# Patient Record
Sex: MALE
[De-identification: names, ages, dates, MRNs, and addresses within clinical notes are randomized per-mention and may not be internally consistent; named-entity substitution may affect disease eponyms.]

---

## 2023-02-09 ENCOUNTER — NURSE TRIAGE (OUTPATIENT)
Dept: OTHER | Facility: CLINIC | Age: 18
End: 2023-02-09

## 2023-02-09 NOTE — TELEPHONE ENCOUNTER
Location of patient: [place]     Subjective: Caller states he is having chest tightness in the center of chest. Pt states he recently got over a cold. Pt states it tightness comes and goes and at times is in his lower left back. Former smoker. Denies lung hx. Pt denies cough. Current Symptoms: Chest tightness    Onset: 3 days ago; gradual    Associated Symptoms:     Pain Severity: 6/10; pressure; intermittent/ tightness    Temperature: Denies     What has been tried: Dayquil    LMP: NA Pregnant: NA    Recommended disposition: See PCP within 24 Hours    Care advice provided, patient verbalizes understanding; denies any other questions or concerns; instructed to call back for any new or worsening symptoms. Patient/caller agrees to follow-up with PCP     This triage is a result of a call to [address] [place] [address]. Please do not respond to the triage nurse through this encounter. Any subsequent communication should be directly with the patient.           Reason for Disposition   [1] Chest pain lasts < 5 minutes AND [2] NO chest pain or cardiac symptoms (e.g., breathing difficulty, sweating) now (Exception: chest pains that last only a few seconds)    Protocols used: Chest Pain-ADULT-

## 2024-01-12 ENCOUNTER — HOSPITAL ENCOUNTER (EMERGENCY)
Facility: HOSPITAL | Age: 19
Discharge: HOME | End: 2024-01-13
Payer: COMMERCIAL

## 2024-01-12 DIAGNOSIS — J02.9 SORE THROAT: Primary | ICD-10-CM

## 2024-01-12 PROBLEM — H53.149 EYES SENSITIVE TO LIGHT: Status: ACTIVE | Noted: 2024-01-12

## 2024-01-12 PROBLEM — R51.9 CHRONIC HEADACHES: Status: ACTIVE | Noted: 2024-01-12

## 2024-01-12 PROBLEM — F43.9 STRESS: Status: ACTIVE | Noted: 2024-01-12

## 2024-01-12 PROBLEM — E66.9 CHILDHOOD OBESITY: Status: ACTIVE | Noted: 2024-01-12

## 2024-01-12 PROBLEM — G89.29 CHRONIC HEADACHES: Status: ACTIVE | Noted: 2024-01-12

## 2024-01-12 PROBLEM — J30.9 ALLERGIC RHINITIS: Status: ACTIVE | Noted: 2024-01-12

## 2024-01-12 PROBLEM — F48.9 MOOD PROBLEM: Status: ACTIVE | Noted: 2024-01-12

## 2024-01-12 PROBLEM — G43.109 COMPLICATED MIGRAINE: Status: ACTIVE | Noted: 2024-01-12

## 2024-01-12 PROBLEM — R10.9 LEFT FLANK PAIN: Status: ACTIVE | Noted: 2024-01-12

## 2024-01-12 PROBLEM — F43.20 ADJUSTMENT REACTION: Status: ACTIVE | Noted: 2024-01-12

## 2024-01-12 PROCEDURE — 87651 STREP A DNA AMP PROBE: CPT | Performed by: PHYSICIAN ASSISTANT

## 2024-01-12 PROCEDURE — 87636 SARSCOV2 & INF A&B AMP PRB: CPT | Performed by: PHYSICIAN ASSISTANT

## 2024-01-12 PROCEDURE — 99283 EMERGENCY DEPT VISIT LOW MDM: CPT

## 2024-01-12 ASSESSMENT — PAIN SCALES - GENERAL: PAINLEVEL_OUTOF10: 7

## 2024-01-12 ASSESSMENT — PAIN - FUNCTIONAL ASSESSMENT: PAIN_FUNCTIONAL_ASSESSMENT: 0-10

## 2024-01-12 ASSESSMENT — PAIN DESCRIPTION - LOCATION: LOCATION: THROAT

## 2024-01-12 NOTE — Clinical Note
Wilfredo Nunes was seen and treated in our emergency department on 1/12/2024.  He may return to work on 01/15/2024.       If you have any questions or concerns, please don't hesitate to call.      Gracia Gan PA-C

## 2024-01-13 VITALS
RESPIRATION RATE: 16 BRPM | DIASTOLIC BLOOD PRESSURE: 73 MMHG | HEART RATE: 74 BPM | WEIGHT: 170 LBS | BODY MASS INDEX: 23.8 KG/M2 | OXYGEN SATURATION: 99 % | SYSTOLIC BLOOD PRESSURE: 128 MMHG | TEMPERATURE: 98.4 F | HEIGHT: 71 IN

## 2024-01-13 LAB
FLUAV RNA RESP QL NAA+PROBE: NOT DETECTED
FLUBV RNA RESP QL NAA+PROBE: NOT DETECTED
S PYO DNA THROAT QL NAA+PROBE: NOT DETECTED
SARS-COV-2 RNA RESP QL NAA+PROBE: NOT DETECTED

## 2024-01-13 PROCEDURE — 2500000001 HC RX 250 WO HCPCS SELF ADMINISTERED DRUGS (ALT 637 FOR MEDICARE OP): Performed by: PHYSICIAN ASSISTANT

## 2024-01-13 RX ORDER — IBUPROFEN 600 MG/1
600 TABLET ORAL ONCE
Status: COMPLETED | OUTPATIENT
Start: 2024-01-13 | End: 2024-01-13

## 2024-01-13 RX ORDER — IBUPROFEN 600 MG/1
600 TABLET ORAL EVERY 6 HOURS PRN
Qty: 20 TABLET | Refills: 0 | Status: SHIPPED | OUTPATIENT
Start: 2024-01-13

## 2024-01-13 RX ORDER — ACETAMINOPHEN 325 MG/1
975 TABLET ORAL ONCE
Status: COMPLETED | OUTPATIENT
Start: 2024-01-13 | End: 2024-01-13

## 2024-01-13 RX ADMIN — ACETAMINOPHEN 975 MG: 325 TABLET ORAL at 00:18

## 2024-01-13 RX ADMIN — IBUPROFEN 600 MG: 600 TABLET, FILM COATED ORAL at 00:18

## 2024-01-13 NOTE — ED PROVIDER NOTES
Chief Complaint   Patient presents with    Sore Throat   HPI:   Wilfredo Nunes is an otherwise healthy 18 y.o. male who presents to the ED for 2 days of sore throat, odynophagia, painful lymphadenopathy.  He endorses associated subjective fever, chills, bilateral ear pain.  Rates his pain 7/10.  Took ibuprofen yesterday which did help.  Has also tried hot tea that helps.  Worse with swallowing.  Denies nausea, vomiting, diarrhea, abdominal pain, cough, hemoptysis, dizziness or lightheadedness, headache, dysphagia, drooling, voice changes.  Denies sick contacts.    Medications: None  Soc HX:  No Known Allergies: NKDA  Past Medical History:   Diagnosis Date    Nondisplaced fracture of proximal phalanx of left little finger, initial encounter for closed fracture 01/14/2019    Closed nondisplaced fracture of proximal phalanx of left little finger    Other fracture of unspecified lower leg, initial encounter for closed fracture 05/01/2015    Avulsion fracture of ankle    Personal history of other diseases of the musculoskeletal system and connective tissue 03/26/2019    History of rhabdomyolysis     History reviewed. No pertinent surgical history.  No family history on file.     Physical Exam  Vitals and nursing note reviewed.   Constitutional:       General: He is not in acute distress.     Appearance: Normal appearance. He is not ill-appearing or toxic-appearing.   HENT:      Head: Normocephalic and atraumatic.      Right Ear: Tympanic membrane, ear canal and external ear normal. No tenderness.      Left Ear: Tympanic membrane, ear canal and external ear normal. No tenderness.      Ears:      Comments: No mastoid tenderness nor swelling     Nose: Congestion present. No rhinorrhea.      Mouth/Throat:      Mouth: Mucous membranes are moist. No oral lesions.      Pharynx: Uvula midline. Posterior oropharyngeal erythema present. No pharyngeal swelling, oropharyngeal exudate or uvula swelling.      Tonsils: Tonsillar  exudate present. No tonsillar abscesses. 1+ on the right. 1+ on the left.   Eyes:      Pupils: Pupils are equal, round, and reactive to light.   Cardiovascular:      Rate and Rhythm: Normal rate and regular rhythm.      Pulses: Normal pulses.      Heart sounds: Normal heart sounds. No murmur heard.  Pulmonary:      Effort: Pulmonary effort is normal. No respiratory distress.      Breath sounds: Normal breath sounds.   Abdominal:      General: Bowel sounds are normal.      Palpations: Abdomen is soft.      Tenderness: There is no abdominal tenderness. There is no guarding or rebound.   Musculoskeletal:         General: No deformity or signs of injury. Normal range of motion.      Cervical back: Normal range of motion and neck supple.   Lymphadenopathy:      Cervical: Cervical adenopathy present.   Skin:     General: Skin is warm and dry.      Capillary Refill: Capillary refill takes less than 2 seconds.      Findings: No bruising or rash.   Neurological:      Mental Status: He is alert.      Cranial Nerves: No cranial nerve deficit.      Comments: CN II through XII grossly intact     VS: As documented in the triage note and EMR flowsheet from this visit were reviewed.      Medical Decision Making:   ED Course as of 01/13/24 0107   Sat Jan 13, 2024   0002 Patient is 18-year-old male presents to the ED for evaluation of sore throat.  On exam he has symmetrical tonsillar lymphadenopathy, symmetrically enlarged tonsils with patchy white exudate.  Uvula midline.  No evidence of peritonsillar abscess nor Bubba's angina.  No rashes.  Will obtain COVID, flu, strep.  Patient to given Tylenol and ibuprofen. [KA]   0004 Vitals Reviewed: Afebrile. Normotensive. Not tachycardic nor tachypneic. No hypoxia.   [KA]   0103 Swabs negative.  Patient counseled on findings.  Recommended continued supportive care.  Will send home with prescription for ibuprofen.  Follow-up with primary care provider. [KA]      ED Course User Index  [KA]  Gracia Gan PA-C         Diagnoses as of 01/13/24 0107   Sore throat      Escalation of Care: Appropriate for outpatient management   Counseling: Spoke with the patient and discussed today´s findings, in addition to providing specific details for the plan of care and expected course.  Patient was given the opportunity to ask questions.    Discussed return precautions and importance of follow-up.  Advised to follow-up with ECP.  Advised to return to the ED for changing or worsening symptoms, new symptoms, complaint specific precautions, and precautions listed on the discharge paperwork.  Educated on the common potential side effects of medications prescribed.    I advised the patient that the emergency evaluation and treatment provided today doesn't end their need for medical care. It is very important that they follow-up with their primary care provider or other specialist as instructed.    The plan of care was mutually agreed upon with the patient. The patient and/or family were given the opportunity to ask questions. All questions asked today in the ED were answered to the best of my ability with today's information.    I specifically advised the patient to return to the ED for changing or worsening symptoms, worrisome new symptoms, or for any complaint specific precautions listed on the discharge paperwork.    This patient was cared for in the setting of nationwide stress on resources and staffing.    This report was transcribed using voice recognition software.  Every effort was made to ensure accuracy, however, inadvertently computerized transcription errors may be present.       Gracia Gan PA-C  01/13/24 0107

## 2024-01-13 NOTE — DISCHARGE INSTRUCTIONS
All of your swabs were negative today.  If your culture for strep comes back positive you will be contacted by pharmacy and medications will be initiated at that time.  Continue supportive care including rest, hydration, Tylenol and/or NSAIDs as needed for pain and fever.  Follow-up with primary care provider within the next 2 to 5 days.  Return to nearest ER for any new or worsening symptoms.

## 2024-02-29 ENCOUNTER — APPOINTMENT (OUTPATIENT)
Dept: RADIOLOGY | Facility: HOSPITAL | Age: 19
End: 2024-02-29
Payer: COMMERCIAL

## 2024-02-29 ENCOUNTER — APPOINTMENT (OUTPATIENT)
Dept: CARDIOLOGY | Facility: HOSPITAL | Age: 19
End: 2024-02-29
Payer: COMMERCIAL

## 2024-02-29 LAB
BASOPHILS # BLD AUTO: 0.04 X10*3/UL (ref 0–0.1)
BASOPHILS NFR BLD AUTO: 0.5 %
EOSINOPHIL # BLD AUTO: 0.11 X10*3/UL (ref 0–0.7)
EOSINOPHIL NFR BLD AUTO: 1.4 %
ERYTHROCYTE [DISTWIDTH] IN BLOOD BY AUTOMATED COUNT: 12 % (ref 11.5–14.5)
HCT VFR BLD AUTO: 40.2 % (ref 41–52)
HGB BLD-MCNC: 13.8 G/DL (ref 13.5–17.5)
IMM GRANULOCYTES # BLD AUTO: 0.02 X10*3/UL (ref 0–0.7)
IMM GRANULOCYTES NFR BLD AUTO: 0.3 % (ref 0–0.9)
LYMPHOCYTES # BLD AUTO: 3.9 X10*3/UL (ref 1.2–4.8)
LYMPHOCYTES NFR BLD AUTO: 50.1 %
MCH RBC QN AUTO: 32.6 PG (ref 26–34)
MCHC RBC AUTO-ENTMCNC: 34.3 G/DL (ref 32–36)
MCV RBC AUTO: 95 FL (ref 80–100)
MONOCYTES # BLD AUTO: 0.55 X10*3/UL (ref 0.1–1)
MONOCYTES NFR BLD AUTO: 7.1 %
NEUTROPHILS # BLD AUTO: 3.16 X10*3/UL (ref 1.2–7.7)
NEUTROPHILS NFR BLD AUTO: 40.6 %
NRBC BLD-RTO: 0 /100 WBCS (ref 0–0)
PLATELET # BLD AUTO: 227 X10*3/UL (ref 150–450)
RBC # BLD AUTO: 4.23 X10*6/UL (ref 4.5–5.9)
WBC # BLD AUTO: 7.8 X10*3/UL (ref 4.4–11.3)

## 2024-02-29 PROCEDURE — 99284 EMERGENCY DEPT VISIT MOD MDM: CPT | Mod: 25 | Performed by: EMERGENCY MEDICINE

## 2024-02-29 PROCEDURE — 84484 ASSAY OF TROPONIN QUANT: CPT | Performed by: EMERGENCY MEDICINE

## 2024-02-29 PROCEDURE — 93005 ELECTROCARDIOGRAM TRACING: CPT

## 2024-02-29 PROCEDURE — 36415 COLL VENOUS BLD VENIPUNCTURE: CPT | Performed by: EMERGENCY MEDICINE

## 2024-02-29 PROCEDURE — 96374 THER/PROPH/DIAG INJ IV PUSH: CPT | Performed by: EMERGENCY MEDICINE

## 2024-02-29 PROCEDURE — 87636 SARSCOV2 & INF A&B AMP PRB: CPT | Performed by: EMERGENCY MEDICINE

## 2024-02-29 PROCEDURE — 71046 X-RAY EXAM CHEST 2 VIEWS: CPT | Mod: FOREIGN READ | Performed by: RADIOLOGY

## 2024-02-29 PROCEDURE — 71046 X-RAY EXAM CHEST 2 VIEWS: CPT

## 2024-02-29 PROCEDURE — 85025 COMPLETE CBC W/AUTO DIFF WBC: CPT | Performed by: EMERGENCY MEDICINE

## 2024-02-29 PROCEDURE — 80053 COMPREHEN METABOLIC PANEL: CPT | Performed by: EMERGENCY MEDICINE

## 2024-02-29 ASSESSMENT — COLUMBIA-SUICIDE SEVERITY RATING SCALE - C-SSRS
6. HAVE YOU EVER DONE ANYTHING, STARTED TO DO ANYTHING, OR PREPARED TO DO ANYTHING TO END YOUR LIFE?: NO
2. HAVE YOU ACTUALLY HAD ANY THOUGHTS OF KILLING YOURSELF?: NO
1. IN THE PAST MONTH, HAVE YOU WISHED YOU WERE DEAD OR WISHED YOU COULD GO TO SLEEP AND NOT WAKE UP?: NO

## 2024-02-29 ASSESSMENT — PAIN - FUNCTIONAL ASSESSMENT: PAIN_FUNCTIONAL_ASSESSMENT: 0-10

## 2024-02-29 ASSESSMENT — PAIN SCALES - GENERAL: PAINLEVEL_OUTOF10: 7

## 2024-02-29 ASSESSMENT — PAIN DESCRIPTION - LOCATION: LOCATION: CHEST

## 2024-03-01 ENCOUNTER — APPOINTMENT (OUTPATIENT)
Dept: CARDIOLOGY | Facility: HOSPITAL | Age: 19
End: 2024-03-01
Payer: COMMERCIAL

## 2024-03-01 ENCOUNTER — HOSPITAL ENCOUNTER (EMERGENCY)
Facility: HOSPITAL | Age: 19
Discharge: HOME | End: 2024-03-01
Attending: EMERGENCY MEDICINE
Payer: COMMERCIAL

## 2024-03-01 VITALS
OXYGEN SATURATION: 100 % | RESPIRATION RATE: 15 BRPM | BODY MASS INDEX: 24.22 KG/M2 | TEMPERATURE: 98.1 F | HEIGHT: 71 IN | DIASTOLIC BLOOD PRESSURE: 66 MMHG | SYSTOLIC BLOOD PRESSURE: 117 MMHG | WEIGHT: 173 LBS | HEART RATE: 57 BPM

## 2024-03-01 DIAGNOSIS — R07.89 CHEST WALL PAIN: Primary | ICD-10-CM

## 2024-03-01 LAB
ALBUMIN SERPL BCP-MCNC: 4.5 G/DL (ref 3.4–5)
ALP SERPL-CCNC: 55 U/L (ref 33–120)
ALT SERPL W P-5'-P-CCNC: 10 U/L (ref 10–52)
ANION GAP SERPL CALC-SCNC: 12 MMOL/L (ref 10–20)
AST SERPL W P-5'-P-CCNC: 18 U/L (ref 9–39)
BILIRUB SERPL-MCNC: 0.5 MG/DL (ref 0–1.2)
BUN SERPL-MCNC: 13 MG/DL (ref 6–23)
CALCIUM SERPL-MCNC: 8.9 MG/DL (ref 8.6–10.3)
CARDIAC TROPONIN I PNL SERPL HS: 3 NG/L (ref 0–20)
CHLORIDE SERPL-SCNC: 103 MMOL/L (ref 98–107)
CO2 SERPL-SCNC: 28 MMOL/L (ref 21–32)
CREAT SERPL-MCNC: 1.07 MG/DL (ref 0.5–1.3)
EGFRCR SERPLBLD CKD-EPI 2021: >90 ML/MIN/1.73M*2
FLUAV RNA RESP QL NAA+PROBE: NOT DETECTED
FLUBV RNA RESP QL NAA+PROBE: NOT DETECTED
GLUCOSE SERPL-MCNC: 85 MG/DL (ref 74–99)
POTASSIUM SERPL-SCNC: 3.5 MMOL/L (ref 3.5–5.3)
PROT SERPL-MCNC: 7.6 G/DL (ref 6.4–8.2)
SARS-COV-2 RNA RESP QL NAA+PROBE: NOT DETECTED
SODIUM SERPL-SCNC: 139 MMOL/L (ref 136–145)

## 2024-03-01 PROCEDURE — 2500000001 HC RX 250 WO HCPCS SELF ADMINISTERED DRUGS (ALT 637 FOR MEDICARE OP)

## 2024-03-01 PROCEDURE — 2500000004 HC RX 250 GENERAL PHARMACY W/ HCPCS (ALT 636 FOR OP/ED)

## 2024-03-01 PROCEDURE — 93005 ELECTROCARDIOGRAM TRACING: CPT

## 2024-03-01 PROCEDURE — 96374 THER/PROPH/DIAG INJ IV PUSH: CPT | Performed by: EMERGENCY MEDICINE

## 2024-03-01 RX ORDER — CYCLOBENZAPRINE HCL 5 MG
5 TABLET ORAL ONCE
Status: COMPLETED | OUTPATIENT
Start: 2024-03-01 | End: 2024-03-01

## 2024-03-01 RX ORDER — KETOROLAC TROMETHAMINE 30 MG/ML
30 INJECTION, SOLUTION INTRAMUSCULAR; INTRAVENOUS ONCE
Status: COMPLETED | OUTPATIENT
Start: 2024-03-01 | End: 2024-03-01

## 2024-03-01 RX ADMIN — KETOROLAC TROMETHAMINE 30 MG: 30 INJECTION, SOLUTION INTRAMUSCULAR at 01:00

## 2024-03-01 RX ADMIN — CYCLOBENZAPRINE HYDROCHLORIDE 5 MG: 5 TABLET, FILM COATED ORAL at 01:46

## 2024-03-01 ASSESSMENT — PAIN - FUNCTIONAL ASSESSMENT: PAIN_FUNCTIONAL_ASSESSMENT: 0-10

## 2024-03-01 ASSESSMENT — PAIN DESCRIPTION - LOCATION: LOCATION: CHEST

## 2024-03-01 ASSESSMENT — PAIN SCALES - GENERAL
PAINLEVEL_OUTOF10: 0 - NO PAIN
PAINLEVEL_OUTOF10: 2
PAINLEVEL_OUTOF10: 7
PAINLEVEL_OUTOF10: 7

## 2024-03-01 NOTE — ED TRIAGE NOTES
As provider-in-triage, I performed a medical screening history and physical exam on this patient.  HISTORY OF PRESENT ILLNESS  This a 19-year-old male presenting with right-sided chest pain x 3 days.  He explains that his pain started when he was sitting at rest and slightly rotated his position.  He states that his pain is worse with extension of the shoulder and adduction of the arm.  Denies trauma.  His pain is localized to the costochondral junction on the right side about the third and fourth rib.  Denies numbness or tingling.  The pain does not radiate.  States that the pain is sharp.  States sometimes when he has the pain he feels that he has to take a deep breath.  The pain is not pleuritic.  No shortness of breath.  No abdominal pain nausea or vomiting.  No diarrhea constipation or urination complaints.     PHYSICAL EXAM  Vital Signs reviewed.  19-year-old male in no acute distress.  PERRLA, EOMI. mucous membranes are moist.  Oropharynx is clear.  Heart rate and rhythm is regular normal S1-S2.  Lungs are clear to auscultation bilaterally.  Abdomen is soft nontender.  He is directly tender over the costochondral junction about the fourth rib.  Pain is exacerbated upon resisted adduction of the arm.  No pain with range of motion of the elbow.  No  tenderness in the neck no step-offs.      MDM  Cardiac workup.  Likely musculoskeletal etiology           I evaluated this patient in triage with the RN. Due to the patients complaint labs and or imaging were ordered by myself in an attempt to expedite patient care however I am not participating in care after evaluation. This is a preliminary assessment. Pt does not appear in acute distress at this time. They are stable and will have a full evaluation as soon as possible. They will be cared for by another provider who will possibly order more labs, imaging or interventions. Pt did not have a full ROS or PE completed by myself however below is a summary with reasons  for orders.

## 2024-03-01 NOTE — ED PROVIDER NOTES
HPI:      Limitations to History: None  Additional History Obtained from: Grandmother   External records reviewed: prior EMR notes    Chief Complaint   Patient presents with    Chest Pain     Patient presents to the ED with c/o CP and SOB for the past 2-3 days.      Shortness of Breath          Wilfredo Nunes is a 19 y.o. male with no past medical history presenting to the ED with central chest pain that started a few days ago, and not associated with any other symptoms.  Patient denies any fevers, chills, shortness of breath, nausea, vomiting, or any abdominal pain.  Denies any recent upper respiratory illnesses.  Notes that his pain does get worse with movement of his arms.  Has not had any similar episodes to this in the past.  No family history of sudden cardiac death.      Past Medical History:   Diagnosis Date    Nondisplaced fracture of proximal phalanx of left little finger, initial encounter for closed fracture 01/14/2019    Closed nondisplaced fracture of proximal phalanx of left little finger    Other fracture of unspecified lower leg, initial encounter for closed fracture 05/01/2015    Avulsion fracture of ankle    Personal history of other diseases of the musculoskeletal system and connective tissue 03/26/2019    History of rhabdomyolysis     No past surgical history on file.     No Known Allergies      --------------------------------------------------------------------------------------------------------------------------------------    VS: As documented in the triage note and EMR flowsheet from this visit were reviewed.  Temp 36.7 °C (98.1 °F) HR 69 /71 RR 16 Sat 98 % on      Physical Exam:  GEN:  no acute distress, appears comfortable. Conversational and appropriate.    HEENT: Normocephalic, atraumatic. Conjunctiva pink with no redness or exudates. Hearing grossly intact. Moist mucous membranes.  CARDIO: Normal rate and regular rhythm. Normal S1, S2  without murmurs, rubs, or gallops.  Chest  wall tenderness to palpation centrally.  PULM: Clear to auscultation bilaterally. No rales, rhonchi, or wheezes. No accessory muscle use or stridor. Speaking in full sentences.  GI: Soft, non-tender, non-distended. No rebound tenderness or guarding.   SKIN: Warm and dry, no rashes, lesions, petechiae, or purpura.  MSK: ROM intact in all 4 extremities without contractures or pain. No peripheral edema, contusions, or wounds.    NEURO: A&Ox3, No focal findings identified. No confusion or gross mental status changes.  PSYCH: Appropriate mood and behavior, converses and responds appropriately during exam.        ---------------------------------------------------------------------------------------------------------------------------------------    Given in the ED:   Medications   cyclobenzaprine (Flexeril) tablet 5 mg (has no administration in time range)   ketorolac (Toradol) injection 30 mg (30 mg intravenous Given 3/1/24 0100)          Work up: All labs and imaging were independently reviewed by me.    Labs Reviewed   CBC WITH AUTO DIFFERENTIAL - Abnormal       Result Value    WBC 7.8      nRBC 0.0      RBC 4.23 (*)     Hemoglobin 13.8      Hematocrit 40.2 (*)     MCV 95      MCH 32.6      MCHC 34.3      RDW 12.0      Platelets 227      Neutrophils % 40.6      Immature Granulocytes %, Automated 0.3      Lymphocytes % 50.1      Monocytes % 7.1      Eosinophils % 1.4      Basophils % 0.5      Neutrophils Absolute 3.16      Immature Granulocytes Absolute, Automated 0.02      Lymphocytes Absolute 3.90      Monocytes Absolute 0.55      Eosinophils Absolute 0.11      Basophils Absolute 0.04     COMPREHENSIVE METABOLIC PANEL - Normal    Glucose 85      Sodium 139      Potassium 3.5      Chloride 103      Bicarbonate 28      Anion Gap 12      Urea Nitrogen 13      Creatinine 1.07      eGFR >90      Calcium 8.9      Albumin 4.5      Alkaline Phosphatase 55      Total Protein 7.6      AST 18      Bilirubin, Total 0.5      ALT  10     TROPONIN I, HIGH SENSITIVITY - Normal    Troponin I, High Sensitivity 3      Narrative:     Less than 99th percentile of normal range cutoff-  Female and children under 18 years old <14 ng/L; Male <21 ng/L: Negative  Repeat testing should be performed if clinically indicated.     Female and children under 18 years old 14-50 ng/L; Male 21-50 ng/L:  Consistent with possible cardiac damage and possible increased clinical   risk. Serial measurements may help to assess extent of myocardial damage.     >50 ng/L: Consistent with cardiac damage, increased clinical risk and  myocardial infarction. Serial measurements may help assess extent of   myocardial damage.      NOTE: Children less than 1 year old may have higher baseline troponin   levels and results should be interpreted in conjunction with the overall   clinical context.     NOTE: Troponin I testing is performed using a different   testing methodology at [facility] than at other   [facility]. Direct result comparisons should only   be made within the same method.   SARS-COV-2 AND INFLUENZA A/B PCR - Normal    Flu A Result Not Detected      Flu B Result Not Detected      Coronavirus 2019, PCR Not Detected      Narrative:     This assay has received FDA Emergency Use Authorization (EUA) and  is only authorized for the duration of time that circumstances exist to justify the authorization of the emergency use of in vitro diagnostic tests for the detection of SARS-CoV-2 virus and/or diagnosis of COVID-19 infection under section 564(b)(1) of the Act, 21 U.S.C. 360bbb-3(b)(1). Testing for SARS-CoV-2 is only recommended for patients who meet current clinical and/or epidemiological criteria as defined by federal, state, or local public health directives. This assay is an in vitro diagnostic nucleic acid amplification test for the qualitative detection of SARS-CoV-2, Influenza A, and Influenza B from nasopharyngeal specimens and has been validated  for use at [facility]. Negative results do not preclude COVID-19 infections or Influenza A/B infections, and should not be used as the sole basis for diagnosis, treatment, or other management decisions. If Influenza A/B and RSV PCR results are negative, testing for Parainfluenza virus, Adenovirus and Metapneumovirus is routinely performed for [facility] pediatric oncology and intensive care inpatients, and is available on other patients by placing an add-on request.        XR chest 2 views   Final Result   No acute cardiopulmonary disease.   Signed by Deondre Lee          ED Course as of 03/01/24 0146   Fri Mar 01, 2024   0146 EKG showing normal sinus rhythm at 63 bpm, mildly prolonged MT interval, otherwise normal intervals, normal axis, no ST elevations or depressions, T wave inversions in V1 and V2, otherwise unremarkable.  Unchanged from prior EKG performed. [AD]      ED Course User Index  [AD] Ruthie Mars DO         Diagnoses as of 03/01/24 0146   Chest wall pain         MDM:  Briefly, this is a previously healthy 19-year-old male who is presenting with musculoskeletal chest pain, significantly improved with Toradol.  Provider in triage ordered lab work, troponin, and viral swabs, that was found to be grossly unremarkable.  EKG did not show any acute findings suggestive of cardiac abnormalities.  Chest x-ray was also within normal limits.  Given his age, lack of prior medical history, low suspicion for cardiac causes of chest pain.  Low suspicion for PE given normal heart rate, nonpleuritic chest pain, and lack of shortness of breath.  Low suspicion for GERD or reflux due to lack of sour taste in mouth in the morning, not exacerbated by food, seems to be constant rather than intermittent.  Advised to take Tylenol and ibuprofen at home as needed for pain.  Patient voiced understanding is comfortable plan for discharge home.  Grandmother present for patient's visit and is also comfortable  with current plan.      Impression:   1. Chest wall pain        Plan and Disposition: discharge home, advised return if worse. They understand return precautions and discharge instructions. Patient was in agreement with this plan.        Ruthie Vazquez DO  Emergency Medicine, PGY-2    Patient was seen and evaluated by the attending physician. The attending ED physician agrees with the plan. Patient and/or patient´s representative was counseled regarding labs, imaging, likely diagnosis, and plan. All questions were answered.  Disclaimer: This note was dictated by speech recognition.  Attempt at proofreading was made to minimize errors.  Errors in transcription may be present.  Please call if questions.     Ruthie Vazquez DO  Resident  03/01/24 0157

## 2024-03-01 NOTE — DISCHARGE INSTRUCTIONS
Please return to the emergency department, should you have any worsening symptoms, are unable to get an appointment with your primary care physician within the discussed time frame, or have any further concerns.   He was seen here, found to have chest wall pain, you may take Tylenol and NSAIDs as needed for pain.      Please follow up with: Primary care as needed within the next few days.    You make take ibuprofen or tylenol for pain. You may alternate ibuprofen and tylenol every 6 hours for better pain control.    Do not exceed 2400mg of ibuprofen or 4000mg of tylenol in a 24 hour period.

## 2024-03-02 LAB
ATRIAL RATE: 58 BPM
ATRIAL RATE: 63 BPM
P AXIS: 69 DEGREES
P AXIS: 81 DEGREES
P OFFSET: 214 MS
P OFFSET: 218 MS
P ONSET: 157 MS
P ONSET: 159 MS
PR INTERVAL: 126 MS
PR INTERVAL: 128 MS
Q ONSET: 221 MS
Q ONSET: 222 MS
QRS COUNT: 10 BEATS
QRS COUNT: 10 BEATS
QRS DURATION: 100 MS
QRS DURATION: 98 MS
QT INTERVAL: 372 MS
QT INTERVAL: 386 MS
QTC CALCULATION(BAZETT): 378 MS
QTC CALCULATION(BAZETT): 380 MS
QTC FREDERICIA: 378 MS
QTC FREDERICIA: 381 MS
R AXIS: 21 DEGREES
R AXIS: 34 DEGREES
T AXIS: 35 DEGREES
T AXIS: 51 DEGREES
T OFFSET: 408 MS
T OFFSET: 414 MS
VENTRICULAR RATE: 58 BPM
VENTRICULAR RATE: 63 BPM

## 2024-07-02 ENCOUNTER — LAB (OUTPATIENT)
Dept: LAB | Facility: LAB | Age: 19
End: 2024-07-02
Payer: COMMERCIAL

## 2024-07-02 ENCOUNTER — APPOINTMENT (OUTPATIENT)
Dept: PRIMARY CARE | Facility: CLINIC | Age: 19
End: 2024-07-02
Payer: COMMERCIAL

## 2024-07-02 VITALS
HEART RATE: 62 BPM | BODY MASS INDEX: 23.81 KG/M2 | OXYGEN SATURATION: 98 % | WEIGHT: 166.3 LBS | DIASTOLIC BLOOD PRESSURE: 78 MMHG | TEMPERATURE: 97.6 F | HEIGHT: 70 IN | SYSTOLIC BLOOD PRESSURE: 128 MMHG

## 2024-07-02 DIAGNOSIS — Z71.1 CONCERN ABOUT STI IN MALE WITHOUT DIAGNOSIS: ICD-10-CM

## 2024-07-02 DIAGNOSIS — Z71.1 CONCERN ABOUT STI IN MALE WITHOUT DIAGNOSIS: Primary | ICD-10-CM

## 2024-07-02 DIAGNOSIS — L65.9 HAIR LOSS: ICD-10-CM

## 2024-07-02 DIAGNOSIS — Z00.00 HEALTH MAINTENANCE EXAMINATION: ICD-10-CM

## 2024-07-02 PROBLEM — E66.9 CHILDHOOD OBESITY: Status: RESOLVED | Noted: 2024-01-12 | Resolved: 2024-07-02

## 2024-07-02 PROCEDURE — 87661 TRICHOMONAS VAGINALIS AMPLIF: CPT

## 2024-07-02 RX ORDER — KETOCONAZOLE 20 MG/ML
SHAMPOO, SUSPENSION TOPICAL 2 TIMES WEEKLY
Qty: 120 ML | Refills: 0 | Status: SHIPPED | OUTPATIENT
Start: 2024-07-04

## 2024-07-02 ASSESSMENT — PATIENT HEALTH QUESTIONNAIRE - PHQ9
SUM OF ALL RESPONSES TO PHQ9 QUESTIONS 1 AND 2: 1
SUM OF ALL RESPONSES TO PHQ9 QUESTIONS 1 AND 2: 0
2. FEELING DOWN, DEPRESSED OR HOPELESS: SEVERAL DAYS
2. FEELING DOWN, DEPRESSED OR HOPELESS: NOT AT ALL
1. LITTLE INTEREST OR PLEASURE IN DOING THINGS: NOT AT ALL
1. LITTLE INTEREST OR PLEASURE IN DOING THINGS: NOT AT ALL
10. IF YOU CHECKED OFF ANY PROBLEMS, HOW DIFFICULT HAVE THESE PROBLEMS MADE IT FOR YOU TO DO YOUR WORK, TAKE CARE OF THINGS AT HOME, OR GET ALONG WITH OTHER PEOPLE: NOT DIFFICULT AT ALL

## 2024-07-02 ASSESSMENT — ENCOUNTER SYMPTOMS
DEPRESSION: 1
LOSS OF SENSATION IN FEET: 0
OCCASIONAL FEELINGS OF UNSTEADINESS: 0

## 2024-07-02 ASSESSMENT — PAIN SCALES - GENERAL: PAINLEVEL: 0-NO PAIN

## 2024-07-02 NOTE — PROGRESS NOTES
Subjective   Patient ID: Wilfredo Nunes is a 19 y.o. male with PMH Tension/Migraine HA and Allergies who presents for Establish Care (Testing ).    HPI   #Allergies to summer grass  -He has sneezing and itchy skin. He would like a cream for his itchy arms but does not know the name of it.     #Concerns for beard hair loss  -He has patchy beard hair loss and is concerned for low testosterone. Denies erectile dysfunction, low libido, muscle loss. Endorses low mood. He noticed the hair loss for the past 2-3 months. He denies any itching. He has not tried to treat it.     PHQ2: 1     #Concern for diabetes  -He eats a lot of candy sometimes. He has not had candy in 3 days. He has an uncle with DM. No family history in his parents. He eats well balanced otherwise.     # Concern for STI  - He is currently sexually active with one partner (female). He uses condoms. He denies any lesions or burning when he voids.     #History of headaches  -Had 1 headache recently, but none in a long time. Takes ibuprofen for his headaches.     #Social:  -He does not smoke. No drinking. No illegal drugs.   -Works at a hotel  -Does not exercise    Current Outpatient Medications   Medication Instructions    ibuprofen 600 mg, oral, Every 6 hours PRN        Review of Systems   Constitutional:  Negative for chills, diaphoresis, fatigue and fever.   HENT:  Negative for congestion, rhinorrhea, sinus pressure, sore throat and voice change.    Eyes:  Negative for redness and itching.   Respiratory:  Negative for cough, chest tightness and shortness of breath.    Cardiovascular:  Negative for chest pain.   Gastrointestinal:  Negative for abdominal distention and abdominal pain.   Endocrine: Negative for cold intolerance and heat intolerance.   Genitourinary:  Negative for difficulty urinating, dysuria, flank pain and hematuria.   Musculoskeletal:  Negative for arthralgias and back pain.   Neurological:  Negative for dizziness, numbness and  "headaches.   Hematological:  Negative for adenopathy.   Psychiatric/Behavioral:  Negative for agitation and behavioral problems.        Objective   /78 (BP Location: Right arm, Patient Position: Sitting, BP Cuff Size: Adult)   Pulse 62   Temp 36.4 °C (97.6 °F) (Temporal)   Ht 1.778 m (5' 10\")   Wt 75.4 kg (166 lb 4.8 oz)   SpO2 98%   BMI 23.86 kg/m²     Physical Exam  Vitals and nursing note reviewed.   Constitutional:       General: He is not in acute distress.     Appearance: Normal appearance. He is not ill-appearing.   HENT:      Head: Normocephalic and atraumatic.      Nose: Nose normal.      Mouth/Throat:      Mouth: Mucous membranes are moist.   Eyes:      Conjunctiva/sclera: Conjunctivae normal.   Cardiovascular:      Rate and Rhythm: Normal rate and regular rhythm.      Heart sounds: Normal heart sounds. No murmur heard.  Pulmonary:      Effort: Pulmonary effort is normal. No respiratory distress.      Breath sounds: Normal breath sounds. No wheezing.   Abdominal:      General: Bowel sounds are normal.      Palpations: Abdomen is soft.      Tenderness: There is no abdominal tenderness.   Musculoskeletal:         General: No swelling. Normal range of motion.      Cervical back: Normal range of motion and neck supple.   Skin:     General: Skin is warm.      Comments: Patchy hair loss noted Left side of beard more than Right. No erythema, no scaly skin noted.    Neurological:      General: No focal deficit present.      Mental Status: He is alert and oriented to person, place, and time. Mental status is at baseline.   Psychiatric:         Mood and Affect: Mood normal.         Behavior: Behavior normal.         Thought Content: Thought content normal.         Judgment: Judgment normal.         Assessment/Plan       Wilfredo Nunes is a 19 y.o. male with PMH Tension/Migraine HA and Allergies who presents for concern hair loss, and concern STI exposure.     Diagnoses and all orders for this " visit:  Concern about STI in male without diagnosis  -     HIV 1/2 Antigen/Antibody Screen with Reflex to Confirmation; Future  -     Hepatitis C antibody; Future  -     C. trachomatis / N. gonorrhoeae, DNA probe; Future  -     Trichomonas vaginalis, Amplified; Future  Health maintenance examination  -     Lipid panel; Future  Hair loss  -     Testosterone, total and free; Future  -     Tsh With Reflex To Free T4 If Abnormal; Future  -     Fungus Culture, Hair/Skin/Nails  -     Ordered ketoconazole (NIZOral) 2 % shampoo; Apply topically 2 times a week. Shampoo daily, leave on for 5-10 minutes, then rinse.   Concern for DM        -     Most of his recent RFP glucose has been normal. No symptoms of DM. Will continue to monitor and follow up with labs as needed.   Return to clinic         -     In 1-2 months for follow up labs, hair loss     Patient seen and discussed with attending, Dr. Bettencourt.    Kami Calixto MD  PGY2 Family Medicine      Render Path Notes In Note?: No

## 2024-07-02 NOTE — PROGRESS NOTES
Attestation: I discussed the patient with the attending and resident, and reviewed the  documentation. I agree with the residents medical decision making and plans as documented in the note.       Stefani Irby MD  FM & PM Resident

## 2024-07-03 LAB — T VAGINALIS RRNA SPEC QL NAA+PROBE: NEGATIVE

## 2024-07-04 ASSESSMENT — ENCOUNTER SYMPTOMS
FATIGUE: 0
SORE THROAT: 0
FLANK PAIN: 0
CHEST TIGHTNESS: 0
DIZZINESS: 0
EYE REDNESS: 0
ADENOPATHY: 0
COUGH: 0
SHORTNESS OF BREATH: 0
BACK PAIN: 0
RHINORRHEA: 0
DYSURIA: 0
ABDOMINAL PAIN: 0
DIAPHORESIS: 0
HEADACHES: 0
CHILLS: 0
SINUS PRESSURE: 0
NUMBNESS: 0
AGITATION: 0
ABDOMINAL DISTENTION: 0
HEMATURIA: 0
FEVER: 0
VOICE CHANGE: 0
DIFFICULTY URINATING: 0
ARTHRALGIAS: 0
EYE ITCHING: 0

## 2024-07-05 LAB — FUNGUS SPEC CULT: NORMAL

## 2024-07-08 LAB — FUNGUS SPEC CULT: NORMAL

## 2024-07-09 NOTE — PROGRESS NOTES
I saw and evaluated the patient. I personally obtained the key and critical portions of the history and physical exam or was physically present for key and critical portions performed by the resident/fellow. I reviewed the resident/fellow's documentation and discussed the patient with the resident/fellow. I agree with the resident/fellow's medical decision making as documented in the note.    Abbi Bettencourt MD

## 2024-07-10 ENCOUNTER — TELEPHONE (OUTPATIENT)
Dept: PRIMARY CARE | Facility: CLINIC | Age: 19
End: 2024-07-10
Payer: COMMERCIAL

## 2024-07-10 NOTE — TELEPHONE ENCOUNTER
Attempted to call patient at numbers listed in chart. Both contact numbers were non-working. I called the alternate , patient's grandmother and left voicemail for patient to call our clinic for lab results. Fungal culture pending final read. Trichomonas was negative. Patient still has other labs pending collection.     Kami Calixto MD  PGY2 Family Medicine

## 2024-07-12 ENCOUNTER — TELEPHONE (OUTPATIENT)
Dept: PRIMARY CARE | Facility: CLINIC | Age: 19
End: 2024-07-12
Payer: COMMERCIAL

## 2024-07-15 LAB — FUNGUS SPEC CULT: NORMAL

## 2024-07-22 LAB — FUNGUS SPEC CULT: NORMAL

## 2024-07-23 ENCOUNTER — TELEPHONE (OUTPATIENT)
Dept: PRIMARY CARE | Facility: CLINIC | Age: 19
End: 2024-07-23
Payer: COMMERCIAL

## 2024-07-23 NOTE — TELEPHONE ENCOUNTER
Attempted to call patient at all numbers listed in his chart to discuss Fungal Culture results that were negative and to encourage patient to get the rest of his labs/UA collected. The first 2 numbers in the chart said they were not connected. The grandmother's number went to voicemail. I left message for patient to call  me back.     Kami Calixto MD  PGY2 Family Medicine

## 2024-08-12 ENCOUNTER — APPOINTMENT (OUTPATIENT)
Dept: PRIMARY CARE | Facility: CLINIC | Age: 19
End: 2024-08-12
Payer: COMMERCIAL

## 2024-12-13 ENCOUNTER — LAB (OUTPATIENT)
Dept: LAB | Facility: LAB | Age: 19
End: 2024-12-13
Payer: COMMERCIAL

## 2024-12-13 ENCOUNTER — APPOINTMENT (OUTPATIENT)
Dept: PRIMARY CARE | Facility: CLINIC | Age: 19
End: 2024-12-13
Payer: COMMERCIAL

## 2024-12-13 VITALS
SYSTOLIC BLOOD PRESSURE: 136 MMHG | WEIGHT: 170.6 LBS | DIASTOLIC BLOOD PRESSURE: 86 MMHG | TEMPERATURE: 97.5 F | BODY MASS INDEX: 23.88 KG/M2 | HEART RATE: 58 BPM | OXYGEN SATURATION: 96 % | RESPIRATION RATE: 18 BRPM | HEIGHT: 71 IN

## 2024-12-13 DIAGNOSIS — Z00.00 HEALTH MAINTENANCE EXAMINATION: ICD-10-CM

## 2024-12-13 DIAGNOSIS — Z11.3 SCREENING EXAMINATION FOR STI: ICD-10-CM

## 2024-12-13 DIAGNOSIS — L65.9 HAIR LOSS: ICD-10-CM

## 2024-12-13 DIAGNOSIS — Z00.00 ANNUAL PHYSICAL EXAM: Primary | ICD-10-CM

## 2024-12-13 DIAGNOSIS — Z71.1 CONCERN ABOUT STI IN MALE WITHOUT DIAGNOSIS: ICD-10-CM

## 2024-12-13 DIAGNOSIS — Z80.9 FAMILY HISTORY OF CANCER IN MOTHER: ICD-10-CM

## 2024-12-13 LAB
CHOLEST SERPL-MCNC: 122 MG/DL (ref 0–199)
CHOLESTEROL/HDL RATIO: 2.1
HDLC SERPL-MCNC: 57.7 MG/DL
HIV 1+2 AB+HIV1 P24 AG SERPL QL IA: NONREACTIVE
LDLC SERPL CALC-MCNC: 56 MG/DL
NON HDL CHOLESTEROL: 64 MG/DL (ref 0–149)
TRIGL SERPL-MCNC: 44 MG/DL (ref 0–114)
TSH SERPL-ACNC: 1.7 MIU/L (ref 0.44–3.98)
VLDL: 9 MG/DL (ref 0–40)

## 2024-12-13 PROCEDURE — 86803 HEPATITIS C AB TEST: CPT

## 2024-12-13 PROCEDURE — 80061 LIPID PANEL: CPT

## 2024-12-13 PROCEDURE — 84402 ASSAY OF FREE TESTOSTERONE: CPT

## 2024-12-13 PROCEDURE — 86780 TREPONEMA PALLIDUM: CPT

## 2024-12-13 PROCEDURE — 87389 HIV-1 AG W/HIV-1&-2 AB AG IA: CPT

## 2024-12-13 PROCEDURE — 36415 COLL VENOUS BLD VENIPUNCTURE: CPT

## 2024-12-13 PROCEDURE — 84443 ASSAY THYROID STIM HORMONE: CPT

## 2024-12-13 ASSESSMENT — COLUMBIA-SUICIDE SEVERITY RATING SCALE - C-SSRS
2. HAVE YOU ACTUALLY HAD ANY THOUGHTS OF KILLING YOURSELF?: NO
6. HAVE YOU EVER DONE ANYTHING, STARTED TO DO ANYTHING, OR PREPARED TO DO ANYTHING TO END YOUR LIFE?: NO
1. IN THE PAST MONTH, HAVE YOU WISHED YOU WERE DEAD OR WISHED YOU COULD GO TO SLEEP AND NOT WAKE UP?: NO

## 2024-12-13 ASSESSMENT — ENCOUNTER SYMPTOMS
OCCASIONAL FEELINGS OF UNSTEADINESS: 0
LOSS OF SENSATION IN FEET: 0
DEPRESSION: 0

## 2024-12-13 ASSESSMENT — PAIN SCALES - GENERAL: PAINLEVEL_OUTOF10: 0-NO PAIN

## 2024-12-13 ASSESSMENT — PATIENT HEALTH QUESTIONNAIRE - PHQ9
2. FEELING DOWN, DEPRESSED OR HOPELESS: NOT AT ALL
1. LITTLE INTEREST OR PLEASURE IN DOING THINGS: NOT AT ALL
SUM OF ALL RESPONSES TO PHQ9 QUESTIONS 1 AND 2: 0

## 2024-12-13 NOTE — PROGRESS NOTES
"Subjective   Patient ID: Wilfredo Nunes is a 19 y.o. male who presents for Annual Exam.    HPI   Patient presents for annual physical exam     STD/STI exposure   - 1 partner in last six months   - denies any urinary symptoms, lesions    Concerns regards family history and cancer  - patient reports that his mother passed away at the age of 31 due to cancer, but he is not sure what kind of cancer his mother had reports that had some issues with going to the bathroom but could also have been breast cancer   - additional concerned because his 2 year old son also had and abdominal cancer (not entire sure what was but Wilms tumor sound familiar)     Review of Systems  ROS negative unless noted in HPI    Objective   /86 (BP Location: Right arm, Patient Position: Sitting, BP Cuff Size: Adult)   Pulse 58   Temp 36.4 °C (97.5 °F) (Temporal)   Resp 18   Ht 1.803 m (5' 11\")   Wt 77.4 kg (170 lb 9.6 oz)   SpO2 96%   BMI 23.79 kg/m²     Physical Exam  Vitals reviewed.   Constitutional:       General: He is not in acute distress.     Appearance: Normal appearance. He is normal weight.   HENT:      Head: Normocephalic and atraumatic.      Right Ear: External ear normal.      Left Ear: External ear normal.      Nose: Nose normal.      Mouth/Throat:      Mouth: Mucous membranes are moist.      Pharynx: Oropharynx is clear. No oropharyngeal exudate or posterior oropharyngeal erythema.   Eyes:      Conjunctiva/sclera: Conjunctivae normal.   Cardiovascular:      Rate and Rhythm: Normal rate and regular rhythm.      Pulses: Normal pulses.      Heart sounds: Normal heart sounds. No murmur heard.     No friction rub. No gallop.   Pulmonary:      Effort: Pulmonary effort is normal. No respiratory distress.      Breath sounds: Normal breath sounds. No stridor. No wheezing, rhonchi or rales.   Chest:      Chest wall: No tenderness.   Abdominal:      General: Abdomen is flat. Bowel sounds are normal. There is no distension.      " Palpations: Abdomen is soft. There is no mass.      Tenderness: There is no abdominal tenderness. There is no right CVA tenderness, left CVA tenderness, guarding or rebound.      Hernia: No hernia is present.   Musculoskeletal:         General: Normal range of motion.      Cervical back: Normal range of motion and neck supple. No rigidity.      Right lower leg: No edema.      Left lower leg: No edema.   Skin:     General: Skin is warm and dry.      Capillary Refill: Capillary refill takes less than 2 seconds.   Neurological:      General: No focal deficit present.      Mental Status: He is alert and oriented to person, place, and time.      Cranial Nerves: No cranial nerve deficit.   Psychiatric:         Mood and Affect: Mood normal.         Behavior: Behavior normal.       Assessment/Plan   Wilfredo Nunes is a 20 y/o M w/ Tension/Migraine HA and allergies who presented for annual physical exam, STD/STI screening, and discussion about family history of cancer.  Plan below:    Diagnoses and all orders for this visit:  Annual physical exam  Screening examination for STI  -     C. trachomatis / N. gonorrhoeae, Amplified; Future  -     Trichomonas vaginalis, Amplified; Future  -     HIV 1/2 Antigen/Antibody Screen with Reflex to Confirmation; Future  -     Hepatitis C antibody; Future  -     Syphilis Screen with Reflex; Future  Family history of cancer in mother  - discuss recommends of referral to genetics, pt declined at this time and will consider following discussion with grandmother in regards to what possible type of cancer pt mother had.  - may consider earlier screening for colorectal cancers given possible familial syndrome   - pt's son with possible history of wilms tumor (? uncertain)   Other orders  -     Follow Up In Primary Care - Established; Future       **RTC in 3-6 months for annual physical exam or sooner if need     Patient was discussed with Dr. Thang Odell MD MS  PGY-3 Family  Medicine

## 2024-12-14 LAB
HCV AB SER QL: NONREACTIVE
TREPONEMA PALLIDUM IGG+IGM AB [PRESENCE] IN SERUM OR PLASMA BY IMMUNOASSAY: NONREACTIVE

## 2024-12-19 LAB
TESTOSTERONE FREE (CHAN): 98.8 PG/ML (ref 35–155)
TESTOSTERONE,TOTAL,LC-MS/MS: 540 NG/DL (ref 250–1100)

## 2024-12-24 NOTE — PROGRESS NOTES
I reviewed the resident/fellow's documentation and discussed the patient with the resident/fellow. I agree with the resident/fellow's medical decision making as documented in the note. I discussed but did not see the patient consistent with the Primary Care Exception.   Agree that age-appropriate cancer screening should be done early bowel screening if there is any question of ileal polyposis or other bowel disorder predisposing to early cancer.  Exploring patient's decision making around smoking and alcohol use may help him avoid these cancer causing substances.             Rozina Desir MD

## 2025-05-02 ENCOUNTER — APPOINTMENT (OUTPATIENT)
Dept: PRIMARY CARE | Facility: CLINIC | Age: 20
End: 2025-05-02
Payer: COMMERCIAL

## 2025-06-13 ENCOUNTER — APPOINTMENT (OUTPATIENT)
Dept: PRIMARY CARE | Facility: CLINIC | Age: 20
End: 2025-06-13
Payer: COMMERCIAL